# Patient Record
(demographics unavailable — no encounter records)

---

## 2024-10-23 NOTE — ASSESSMENT
[FreeTextEntry1] : #Sick Sinus Syndrome / atrial fibrillation s/p AEVIR PPM Patient doing well, follows up with Dr. Coyle.  Latest device interrogation on 07/31 - Normal AP 2%,  <1% previously Follows EP; has an appointment next week with Jonna Maier.   #AFib:   CHADVASc 4 On Eliquis 2.5mg BID (Cr 2.0, eGFR 24) - Will order labs CMP, TFT  - TTE shows EF 60 to 65% degenerative mitral valve, posterior mitral leaflet prolapse, moderate-to-severe mitral regurgitation, trivial aortic regurgitation and Mild tricuspid regurgitation  -Cardiologist evaluation for ischemic work up.   #CKD - Stage 4 Cr 2.0, eGFR 24 Baseline unknown as patient immigrated here 6 months ago - Nephrology evaluation due for repeat labwork   #Anaphylaxis - resolved Patient had an episode of anaphylaxis from beef, was admitted to hospital and took 2 IM Epi doses Allergy profile ordered epi pen rescue ordered for prn>> patient has the rescue pen.  denies any recurrence of allergic symptoms  #HTN Controlled; on Amlodipine 10mg and Bisoprolol 5mg>> Refills sent DASH diet discussed and recommended Exercise and weight loss counseled  Frequency and target at home BP readings discussed Decrease caffeine intake Treatment options and possible side effects discussed Patient counseled on symptoms of hypo/hypertension Counseled: Yearly Ophthalmology exams afib rates goal <110  #Prior episode of syncope Patient reports an episode of lightheadedness and blacking out a year ago no recurrence since last visit  #Depression - Resolved Patient had an episode of depression after the syncope episode, was on sertraline 20mg then stopped taking it. Patient has no suicidal ideation or any depressive symptoms today Depression/Anxiety- Suicide Screening. Patient denies any suicidal and homicidal ideations at this time. Patient will follow up with specialist if worsening symptoms. Information provided on psychiatric ER  cervicalgia likely secondary to kyphosis and resultant posture and trapezius strain we discussed OTC soft postural brace topical modalities preferred   # HCM:  - Colonoscopy: GI referral sent Colon Cancer Screening; Patient counseled on the importance of colonoscopy screening and directed to follow-up with GI doctor. Failure to perform test can result in Colon Cancer and Death. - Vaccine: High dose Flu vaccine and Prevnar given today  Vaccine; All vaccine side effects discussed with pt prior to injection. Tolerated well by patient. Patient instructed to return to office if any side effects; including, but not limited to, rash, fever or vomiting occur. - Mammography ordered  Mammogram Cancer Screening; Patient counseled on the importance of a yearly mammogram screening and directed to have test performed or follow-up with OB/GYN. Failure to perform test can result in breast cancer and death   RTC in 6 months or PRN

## 2024-10-23 NOTE — PHYSICAL EXAM
[Normal] : affect was normal and insight and judgment were intact [No Edema] : there was no peripheral edema [Soft] : abdomen soft [No Rash] : no rash [No Acute Distress] : no acute distress [No Accessory Muscle Use] : no accessory muscle use [Clear to Auscultation] : lungs were clear to auscultation bilaterally [de-identified] : Irregular ; heart rate controlle  [de-identified] : significant kyphosis with b/l engaged trapezius and to less degree scm's

## 2024-10-23 NOTE — HISTORY OF PRESENT ILLNESS
[FreeTextEntry1] : Here for Follow up  [de-identified] : 73y Female patient with PMHx HTN, CKD, history of syncope ;hx of anaphylactic shock due to a beef allergy. During that time patient was found to be in AFib and cardioverted then had an AEVIR PPM implanted.  Complains of heaviness of head sometimes but not significant.  Denies palpitations, fatigue, SOB. Patient reports she started exercising.

## 2024-10-23 NOTE — END OF VISIT
[] : Resident [FreeTextEntry3] : I saw the patient, examined the patient independently, reviewed medical record, and provided the medical services. Agree with resident note as personally edited above. did not perform the baseline bloodwork has not f/u c subspecialists renal and cardio recommended obtaining baseline labs as ordered unclear if current bicarb dosage is reasonable or not appropriate counselling rendered for patient / son at the bedside son provided interpretation per patient preference  posture brace and topical analgesia for the neck for kyphosis and hypertonic trapezius rtc prior to leaving country in several months may ask to return sooner if abnormalities appreciated on bloodwork/ urine

## 2024-10-30 NOTE — HISTORY OF PRESENT ILLNESS
[de-identified] : 73-year-old female, visiting from [facility], with HTN, Hyperlipidemia, prior CVA (on out-pt CT head), allergy to beef, admitted with allergic reaction, anaphylaxis and was found to be in AF RVR, had multiple pauses longest 8 sec, underwent DC Aveir PPM implantation on 06/18/2024 and presents today for routine device interrogation.  The patient denies chest pain/discomfort, dyspnea, palpitations, dizziness, lightheadedness and syncope.

## 2024-10-30 NOTE — PHYSICAL EXAM
[Normal Appearance] : normal appearance [General Appearance - In No Acute Distress] : no acute distress [Heart Rate And Rhythm] : heart rate and rhythm were normal [Heart Sounds] : normal S1 and S2 [] : no respiratory distress [Respiration, Rhythm And Depth] : normal respiratory rhythm and effort [Exaggerated Use Of Accessory Muscles For Inspiration] : no accessory muscle use [Clean] : clean [Dry] : dry [Well-Healed] : well-healed [Nail Clubbing] : no clubbing of the fingernails [Cyanosis, Localized] : no localized cyanosis [Petechial Hemorrhages (___cm)] : no petechial hemorrhages [FreeTextEntry1] : right inguinal incision

## 2024-10-30 NOTE — CARDIOLOGY SUMMARY
[de-identified] : 10/30/2024: NSR at 56 bpm, LVH with repolarization abnormality I and aVL [de-identified] : TTE 06/17/2024: LVEF 60-65%, moderate LAE, moderate to severe MR. [de-identified] : Krishna ARSHAD Aveir on 06/18/2024.

## 2024-10-30 NOTE — ASSESSMENT
[FreeTextEntry1] : 73-year-old female, visiting from [facility], with HTN, Hyperlipidemia, prior CVA (on out-pt CT head), allergy to beef, admitted with allergic reaction, anaphylaxis and was found to be in AF RVR, had multiple pauses longest 8 sec, underwent DC Aveir PPM implantation on 06/18/2024 and presents today for routine device interrogation.  - Device interrogation normal. No events on interrogation. i2i RV to RA 86% and RA to RV 89%.  -Atrial fibrillation-on Eliquis 2.5 mg twice a day (weight and Cr.) No s/s of bleeding. Labs from 10/23/2024 reviewed: H/H 12.4/37.4 and Cr. 2.3.  -HTN-well controlled. Continue Bisoprolol 5 mg daily.  -The patient is going to [facility] and will return after a year.  I have also advised the patient to go to the nearest emergency room if she experiences any chest pain, dyspnea, syncope, or has any other compelling symptoms.

## 2024-10-30 NOTE — PROCEDURE
[No] : not [Sinus Bradycardia] : sinus bradycardia [See Device Printout] : See device printout [Pacemaker] : pacemaker [DDD] : DDD [Longevity: ___ months] : The estimated remaining battery life is [unfilled] months [Threshold Testing Performed] : Threshold testing was performed [Lead Imp:  ___ohms] : lead impedance was [unfilled] ohms [Sensing Amplitude ___mv] : sensing amplitude was [unfilled] mv [___V @] : [unfilled] V [___ ms] : [unfilled] ms [Programmed for Longevity] : output reprogrammed for improved battery longevity [Outputs/Safety Margin] : output changed to allow for adequate safety margin [de-identified] : 55 bpm [de-identified] : [employer]  [de-identified] : Norberto HECK [id number] [de-identified] : [id number] [de-identified] : 06/18/2024 [de-identified] :  bpm [de-identified] : 6.8/15.2 years [de-identified] : Normal device function. AP 14% and  2%. i2i RV to RA 86% and RA to RV 89%. No episodes.